# Patient Record
Sex: MALE
[De-identification: names, ages, dates, MRNs, and addresses within clinical notes are randomized per-mention and may not be internally consistent; named-entity substitution may affect disease eponyms.]

---

## 2021-03-23 PROBLEM — Z00.00 ENCOUNTER FOR PREVENTIVE HEALTH EXAMINATION: Status: ACTIVE | Noted: 2021-03-23

## 2021-03-26 ENCOUNTER — APPOINTMENT (OUTPATIENT)
Dept: ORTHOPEDIC SURGERY | Facility: CLINIC | Age: 26
End: 2021-03-26
Payer: MEDICARE

## 2021-03-26 VITALS
OXYGEN SATURATION: 98 % | SYSTOLIC BLOOD PRESSURE: 127 MMHG | HEART RATE: 91 BPM | BODY MASS INDEX: 24.99 KG/M2 | DIASTOLIC BLOOD PRESSURE: 81 MMHG | HEIGHT: 65 IN | WEIGHT: 150 LBS

## 2021-03-26 DIAGNOSIS — R26.9 UNSPECIFIED ABNORMALITIES OF GAIT AND MOBILITY: ICD-10-CM

## 2021-03-26 DIAGNOSIS — I83.93 ASYMPTOMATIC VARICOSE VEINS OF BILATERAL LOWER EXTREMITIES: ICD-10-CM

## 2021-03-26 DIAGNOSIS — M25.562 PAIN IN RIGHT KNEE: ICD-10-CM

## 2021-03-26 DIAGNOSIS — Z78.9 OTHER SPECIFIED HEALTH STATUS: ICD-10-CM

## 2021-03-26 DIAGNOSIS — M25.561 PAIN IN RIGHT KNEE: ICD-10-CM

## 2021-03-26 PROCEDURE — 99204 OFFICE O/P NEW MOD 45 MIN: CPT

## 2021-03-26 PROCEDURE — 73565 X-RAY EXAM OF KNEES: CPT
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
## 2021-03-26 NOTE — PHYSICAL EXAM
[de-identified] : Physical examination discloses a 25-year-old male ambulating unassisted, however he is noted to have a profound coxalgia bilateral stiff leg gait.  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  Active and passive range of motion of both knees appears relatively pain-free and functionally intact. [de-identified] : Standing AP x-rays of both knees were obtained today.  No signs of intrinsic osseous or soft tissue changes, no joint space narrowing\par \par Review of left and right knee MRIs from June 2020 did not disclose any specific concerning pathology

## 2021-03-26 NOTE — DISCUSSION/SUMMARY
[de-identified] : The patient and his mother were advised to have their son evaluated by a neurologist to disclose the possible etiology of his coxalgia gait.  Follow-up as needed